# Patient Record
Sex: FEMALE | URBAN - METROPOLITAN AREA
[De-identification: names, ages, dates, MRNs, and addresses within clinical notes are randomized per-mention and may not be internally consistent; named-entity substitution may affect disease eponyms.]

---

## 2023-02-01 LAB
C. TRACHOMATIS, EXTERNAL RESULT: NEGATIVE
N. GONORRHOEAE, EXTERNAL RESULT: NEGATIVE

## 2023-02-24 LAB
ABO, EXTERNAL RESULT: NORMAL
HEP B, EXTERNAL RESULT: NEGATIVE
HIV, EXTERNAL RESULT: NON REACTIVE
RH FACTOR, EXTERNAL RESULT: POSITIVE
RPR, EXTERNAL RESULT: NON REACTIVE
RUBELLA TITER, EXTERNAL RESULT: NORMAL

## 2023-09-11 ENCOUNTER — ANESTHESIA (OUTPATIENT)
Dept: LABOR AND DELIVERY | Age: 28
End: 2023-09-11
Payer: OTHER GOVERNMENT

## 2023-09-11 ENCOUNTER — ANESTHESIA EVENT (OUTPATIENT)
Dept: LABOR AND DELIVERY | Age: 28
End: 2023-09-11
Payer: OTHER GOVERNMENT

## 2023-09-11 ENCOUNTER — APPOINTMENT (OUTPATIENT)
Dept: LABOR AND DELIVERY | Age: 28
End: 2023-09-11
Payer: OTHER GOVERNMENT

## 2023-09-11 ENCOUNTER — HOSPITAL ENCOUNTER (INPATIENT)
Age: 28
LOS: 2 days | Discharge: HOME | End: 2023-09-13
Attending: OBSTETRICS & GYNECOLOGY | Admitting: OBSTETRICS & GYNECOLOGY
Payer: OTHER GOVERNMENT

## 2023-09-11 PROBLEM — Z34.90 TERM PREGNANCY: Status: ACTIVE | Noted: 2023-09-11

## 2023-09-11 LAB
ABO + RH BLD: NORMAL
AMPHETAMINES UR QL SCN>1000 NG/ML: NORMAL
BARBITURATES UR QL SCN>200 NG/ML: NORMAL
BASOPHILS # BLD: 0 K/UL (ref 0–0.2)
BASOPHILS NFR BLD: 0.3 %
BENZODIAZ UR QL SCN>200 NG/ML: NORMAL
BLD GP AB SCN SERPL QL: NORMAL
BUPRENORPHINE+NOR UR QL SCN: NORMAL
CANNABINOIDS UR QL SCN>50 NG/ML: NORMAL
COCAINE UR QL SCN: NORMAL
DEPRECATED RDW RBC AUTO: 14.5 % (ref 12.4–15.4)
DRUG SCREEN COMMENT UR-IMP: NORMAL
EOSINOPHIL # BLD: 0.1 K/UL (ref 0–0.6)
EOSINOPHIL NFR BLD: 0.8 %
FENTANYL SCREEN, URINE: NORMAL
HCT VFR BLD AUTO: 36.6 % (ref 36–48)
HGB BLD-MCNC: 12.2 G/DL (ref 12–16)
LYMPHOCYTES # BLD: 1.9 K/UL (ref 1–5.1)
LYMPHOCYTES NFR BLD: 21.8 %
MCH RBC QN AUTO: 27.6 PG (ref 26–34)
MCHC RBC AUTO-ENTMCNC: 33.4 G/DL (ref 31–36)
MCV RBC AUTO: 82.7 FL (ref 80–100)
METHADONE UR QL SCN>300 NG/ML: NORMAL
MONOCYTES # BLD: 0.5 K/UL (ref 0–1.3)
MONOCYTES NFR BLD: 6.4 %
NEUTROPHILS # BLD: 6.1 K/UL (ref 1.7–7.7)
NEUTROPHILS NFR BLD: 70.7 %
OPIATES UR QL SCN>300 NG/ML: NORMAL
OXYCODONE UR QL SCN: NORMAL
PCP UR QL SCN>25 NG/ML: NORMAL
PH UR STRIP: 6 [PH]
PLATELET # BLD AUTO: 175 K/UL (ref 135–450)
PMV BLD AUTO: 8.7 FL (ref 5–10.5)
RBC # BLD AUTO: 4.43 M/UL (ref 4–5.2)
REAGIN+T PALLIDUM IGG+IGM SERPL-IMP: NORMAL
WBC # BLD AUTO: 8.6 K/UL (ref 4–11)

## 2023-09-11 PROCEDURE — 1220000000 HC SEMI PRIVATE OB R&B

## 2023-09-11 PROCEDURE — 86900 BLOOD TYPING SEROLOGIC ABO: CPT

## 2023-09-11 PROCEDURE — 3700000025 EPIDURAL BLOCK: Performed by: ANESTHESIOLOGY

## 2023-09-11 PROCEDURE — 2500000003 HC RX 250 WO HCPCS: Performed by: NURSE ANESTHETIST, CERTIFIED REGISTERED

## 2023-09-11 PROCEDURE — 86901 BLOOD TYPING SEROLOGIC RH(D): CPT

## 2023-09-11 PROCEDURE — 6360000002 HC RX W HCPCS: Performed by: OBSTETRICS & GYNECOLOGY

## 2023-09-11 PROCEDURE — 85025 COMPLETE CBC W/AUTO DIFF WBC: CPT

## 2023-09-11 PROCEDURE — 6360000002 HC RX W HCPCS: Performed by: NURSE ANESTHETIST, CERTIFIED REGISTERED

## 2023-09-11 PROCEDURE — 80307 DRUG TEST PRSMV CHEM ANLYZR: CPT

## 2023-09-11 PROCEDURE — 86850 RBC ANTIBODY SCREEN: CPT

## 2023-09-11 PROCEDURE — 86780 TREPONEMA PALLIDUM: CPT

## 2023-09-11 PROCEDURE — 2580000003 HC RX 258: Performed by: OBSTETRICS & GYNECOLOGY

## 2023-09-11 PROCEDURE — 6370000000 HC RX 637 (ALT 250 FOR IP): Performed by: OBSTETRICS & GYNECOLOGY

## 2023-09-11 RX ORDER — BUPIVACAINE HYDROCHLORIDE 2.5 MG/ML
INJECTION, SOLUTION EPIDURAL; INFILTRATION; INTRACAUDAL PRN
Status: DISCONTINUED | OUTPATIENT
Start: 2023-09-11 | End: 2023-09-12 | Stop reason: SDUPTHER

## 2023-09-11 RX ORDER — CARBOPROST TROMETHAMINE 250 UG/ML
250 INJECTION, SOLUTION INTRAMUSCULAR PRN
Status: DISCONTINUED | OUTPATIENT
Start: 2023-09-11 | End: 2023-09-12

## 2023-09-11 RX ORDER — MISOPROSTOL 100 UG/1
800 TABLET ORAL PRN
Status: DISCONTINUED | OUTPATIENT
Start: 2023-09-11 | End: 2023-09-12

## 2023-09-11 RX ORDER — CEFAZOLIN SODIUM IN 0.9 % NACL 2 G/100 ML
2000 PLASTIC BAG, INJECTION (ML) INTRAVENOUS ONCE
Status: COMPLETED | OUTPATIENT
Start: 2023-09-11 | End: 2023-09-11

## 2023-09-11 RX ORDER — ONDANSETRON 2 MG/ML
4 INJECTION INTRAMUSCULAR; INTRAVENOUS EVERY 6 HOURS PRN
Status: DISCONTINUED | OUTPATIENT
Start: 2023-09-11 | End: 2023-09-12

## 2023-09-11 RX ORDER — SODIUM CHLORIDE 9 MG/ML
25 INJECTION, SOLUTION INTRAVENOUS PRN
Status: DISCONTINUED | OUTPATIENT
Start: 2023-09-11 | End: 2023-09-12

## 2023-09-11 RX ORDER — SODIUM CHLORIDE, SODIUM LACTATE, POTASSIUM CHLORIDE, CALCIUM CHLORIDE 600; 310; 30; 20 MG/100ML; MG/100ML; MG/100ML; MG/100ML
INJECTION, SOLUTION INTRAVENOUS CONTINUOUS
Status: DISCONTINUED | OUTPATIENT
Start: 2023-09-11 | End: 2023-09-12

## 2023-09-11 RX ORDER — SODIUM CHLORIDE 0.9 % (FLUSH) 0.9 %
5-40 SYRINGE (ML) INJECTION PRN
Status: DISCONTINUED | OUTPATIENT
Start: 2023-09-11 | End: 2023-09-12

## 2023-09-11 RX ORDER — SODIUM CHLORIDE, SODIUM LACTATE, POTASSIUM CHLORIDE, AND CALCIUM CHLORIDE .6; .31; .03; .02 G/100ML; G/100ML; G/100ML; G/100ML
1000 INJECTION, SOLUTION INTRAVENOUS PRN
Status: DISCONTINUED | OUTPATIENT
Start: 2023-09-11 | End: 2023-09-12

## 2023-09-11 RX ORDER — SODIUM CHLORIDE 0.9 % (FLUSH) 0.9 %
5-40 SYRINGE (ML) INJECTION EVERY 12 HOURS SCHEDULED
Status: DISCONTINUED | OUTPATIENT
Start: 2023-09-11 | End: 2023-09-12

## 2023-09-11 RX ORDER — DIPHENHYDRAMINE HYDROCHLORIDE 50 MG/ML
25 INJECTION INTRAMUSCULAR; INTRAVENOUS EVERY 4 HOURS PRN
Status: DISCONTINUED | OUTPATIENT
Start: 2023-09-11 | End: 2023-09-12

## 2023-09-11 RX ORDER — ACETAMINOPHEN 325 MG/1
650 TABLET ORAL EVERY 4 HOURS PRN
Status: DISCONTINUED | OUTPATIENT
Start: 2023-09-11 | End: 2023-09-12

## 2023-09-11 RX ORDER — METHYLERGONOVINE MALEATE 0.2 MG/ML
200 INJECTION INTRAVENOUS PRN
Status: DISCONTINUED | OUTPATIENT
Start: 2023-09-11 | End: 2023-09-12

## 2023-09-11 RX ORDER — SODIUM CHLORIDE, SODIUM LACTATE, POTASSIUM CHLORIDE, AND CALCIUM CHLORIDE .6; .31; .03; .02 G/100ML; G/100ML; G/100ML; G/100ML
500 INJECTION, SOLUTION INTRAVENOUS PRN
Status: DISCONTINUED | OUTPATIENT
Start: 2023-09-11 | End: 2023-09-12

## 2023-09-11 RX ADMIN — Medication 15 ML/HR: at 22:23

## 2023-09-11 RX ADMIN — Medication 25 MCG: at 10:05

## 2023-09-11 RX ADMIN — Medication 1 MILLI-UNITS/MIN: at 14:25

## 2023-09-11 RX ADMIN — SODIUM CHLORIDE, POTASSIUM CHLORIDE, SODIUM LACTATE AND CALCIUM CHLORIDE: 600; 310; 30; 20 INJECTION, SOLUTION INTRAVENOUS at 14:15

## 2023-09-11 RX ADMIN — Medication 2000 MG: at 17:35

## 2023-09-11 RX ADMIN — BUPIVACAINE HYDROCHLORIDE 7 ML: 2.5 INJECTION, SOLUTION EPIDURAL; INFILTRATION; INTRACAUDAL; PERINEURAL at 22:17

## 2023-09-11 NOTE — H&P
Department of Obstetrics and Gynecology  Labor and Delivery   Attending Progress Note      SUBJECTIVE:  Patient presents for IOL due to LGA, mild polyhydramnios    OBJECTIVE:      Fetal heart rate:       Baseline Heart Rate:  130        Accelerations:  present       Long Term Variability:  moderate       Decelerations:  absent         Contraction frequency: 0 minutes    Membranes:  Intact    Cervix:         Dilation:  2 cm         Effacement:  50%         Station:  -2                    ASSESSMENT & PLAN:    33 y.o.    OB History          3    Para   1    Term   1            AB   1    Living             SAB   1    IAB        Ectopic        Molar        Multiple        Live Births                 39w2d  1. FWB: reassuring  2. ACOG reviewed. A+, GBS+ in Urine Cx. PCN allergy. Use of Ancef and possibility of cross-reactivity discussed. Patient believes she has had a Cephalosporin without adverse reaction. 3. LGA/Poly:  EFW on  4503 grams(99%), NICKI: 25.1 cm. Options of induction of labor vs proceeding with Primary LTCS reviewed with patient by Dr. Palma Mendoza. Patient reports understanding of options and desires trial of labor. 4.  Induction of labor. Use of Cytotec vs Pitocin reviewed. Patient agrees to use of Cytotec initially.

## 2023-09-12 PROCEDURE — 0KQM0ZZ REPAIR PERINEUM MUSCLE, OPEN APPROACH: ICD-10-PCS | Performed by: OBSTETRICS & GYNECOLOGY

## 2023-09-12 PROCEDURE — 2580000003 HC RX 258: Performed by: OBSTETRICS & GYNECOLOGY

## 2023-09-12 PROCEDURE — 6370000000 HC RX 637 (ALT 250 FOR IP): Performed by: OBSTETRICS & GYNECOLOGY

## 2023-09-12 PROCEDURE — 10907ZC DRAINAGE OF AMNIOTIC FLUID, THERAPEUTIC FROM PRODUCTS OF CONCEPTION, VIA NATURAL OR ARTIFICIAL OPENING: ICD-10-PCS | Performed by: OBSTETRICS & GYNECOLOGY

## 2023-09-12 PROCEDURE — 1220000000 HC SEMI PRIVATE OB R&B

## 2023-09-12 PROCEDURE — 7200000001 HC VAGINAL DELIVERY

## 2023-09-12 PROCEDURE — 6360000002 HC RX W HCPCS: Performed by: OBSTETRICS & GYNECOLOGY

## 2023-09-12 PROCEDURE — 51701 INSERT BLADDER CATHETER: CPT

## 2023-09-12 PROCEDURE — 3E0P7VZ INTRODUCTION OF HORMONE INTO FEMALE REPRODUCTIVE, VIA NATURAL OR ARTIFICIAL OPENING: ICD-10-PCS | Performed by: OBSTETRICS & GYNECOLOGY

## 2023-09-12 RX ORDER — ONDANSETRON 2 MG/ML
4 INJECTION INTRAMUSCULAR; INTRAVENOUS EVERY 6 HOURS PRN
Status: DISCONTINUED | OUTPATIENT
Start: 2023-09-12 | End: 2023-09-13 | Stop reason: HOSPADM

## 2023-09-12 RX ORDER — IBUPROFEN 600 MG/1
600 TABLET ORAL EVERY 8 HOURS PRN
Status: DISCONTINUED | OUTPATIENT
Start: 2023-09-12 | End: 2023-09-13 | Stop reason: HOSPADM

## 2023-09-12 RX ORDER — SODIUM CHLORIDE 0.9 % (FLUSH) 0.9 %
5-40 SYRINGE (ML) INJECTION PRN
Status: DISCONTINUED | OUTPATIENT
Start: 2023-09-12 | End: 2023-09-13 | Stop reason: HOSPADM

## 2023-09-12 RX ORDER — SIMETHICONE 80 MG
80 TABLET,CHEWABLE ORAL EVERY 6 HOURS PRN
Status: DISCONTINUED | OUTPATIENT
Start: 2023-09-12 | End: 2023-09-13 | Stop reason: HOSPADM

## 2023-09-12 RX ORDER — FERROUS SULFATE 325(65) MG
325 TABLET ORAL 2 TIMES DAILY WITH MEALS
Status: DISCONTINUED | OUTPATIENT
Start: 2023-09-12 | End: 2023-09-13 | Stop reason: HOSPADM

## 2023-09-12 RX ORDER — ACETAMINOPHEN 500 MG
1000 TABLET ORAL EVERY 8 HOURS PRN
Status: DISCONTINUED | OUTPATIENT
Start: 2023-09-12 | End: 2023-09-13 | Stop reason: HOSPADM

## 2023-09-12 RX ORDER — SODIUM CHLORIDE, SODIUM LACTATE, POTASSIUM CHLORIDE, CALCIUM CHLORIDE 600; 310; 30; 20 MG/100ML; MG/100ML; MG/100ML; MG/100ML
INJECTION, SOLUTION INTRAVENOUS CONTINUOUS
Status: DISCONTINUED | OUTPATIENT
Start: 2023-09-12 | End: 2023-09-13 | Stop reason: HOSPADM

## 2023-09-12 RX ORDER — DOCUSATE SODIUM 100 MG/1
100 CAPSULE, LIQUID FILLED ORAL 2 TIMES DAILY PRN
Status: DISCONTINUED | OUTPATIENT
Start: 2023-09-12 | End: 2023-09-13 | Stop reason: HOSPADM

## 2023-09-12 RX ORDER — LANOLIN 100 %
OINTMENT (GRAM) TOPICAL PRN
Status: DISCONTINUED | OUTPATIENT
Start: 2023-09-12 | End: 2023-09-13 | Stop reason: HOSPADM

## 2023-09-12 RX ORDER — SODIUM CHLORIDE 9 MG/ML
INJECTION, SOLUTION INTRAVENOUS PRN
Status: DISCONTINUED | OUTPATIENT
Start: 2023-09-12 | End: 2023-09-13 | Stop reason: HOSPADM

## 2023-09-12 RX ORDER — SODIUM CHLORIDE 0.9 % (FLUSH) 0.9 %
5-40 SYRINGE (ML) INJECTION EVERY 12 HOURS SCHEDULED
Status: DISCONTINUED | OUTPATIENT
Start: 2023-09-12 | End: 2023-09-13 | Stop reason: HOSPADM

## 2023-09-12 RX ADMIN — Medication 909.1 MILLI-UNITS/MIN: at 05:15

## 2023-09-12 RX ADMIN — SODIUM CHLORIDE, POTASSIUM CHLORIDE, SODIUM LACTATE AND CALCIUM CHLORIDE: 600; 310; 30; 20 INJECTION, SOLUTION INTRAVENOUS at 01:04

## 2023-09-12 RX ADMIN — DOCUSATE SODIUM 100 MG: 100 CAPSULE, LIQUID FILLED ORAL at 16:58

## 2023-09-12 RX ADMIN — IBUPROFEN 600 MG: 600 TABLET ORAL at 16:57

## 2023-09-12 RX ADMIN — ACETAMINOPHEN 1000 MG: 500 TABLET ORAL at 20:27

## 2023-09-12 RX ADMIN — IBUPROFEN 600 MG: 600 TABLET ORAL at 08:00

## 2023-09-12 RX ADMIN — SODIUM CHLORIDE, PRESERVATIVE FREE 5 ML: 5 INJECTION INTRAVENOUS at 10:30

## 2023-09-12 RX ADMIN — WITCH HAZEL: 500 SOLUTION RECTAL; TOPICAL at 08:01

## 2023-09-12 RX ADMIN — SIMETHICONE 80 MG: 80 TABLET, CHEWABLE ORAL at 16:58

## 2023-09-12 RX ADMIN — BENZOCAINE AND LEVOMENTHOL: 200; 5 SPRAY TOPICAL at 08:01

## 2023-09-12 RX ADMIN — Medication 87.3 MILLI-UNITS/MIN: at 05:14

## 2023-09-12 RX ADMIN — CEFAZOLIN 1000 MG: 1 INJECTION, POWDER, FOR SOLUTION INTRAMUSCULAR; INTRAVENOUS at 01:25

## 2023-09-12 RX ADMIN — SODIUM CHLORIDE, PRESERVATIVE FREE 10 ML: 5 INJECTION INTRAVENOUS at 20:30

## 2023-09-12 RX ADMIN — ACETAMINOPHEN 1000 MG: 500 TABLET ORAL at 12:01

## 2023-09-12 ASSESSMENT — PAIN - FUNCTIONAL ASSESSMENT
PAIN_FUNCTIONAL_ASSESSMENT: ACTIVITIES ARE NOT PREVENTED

## 2023-09-12 ASSESSMENT — PAIN DESCRIPTION - ORIENTATION
ORIENTATION: LOWER
ORIENTATION: LOWER
ORIENTATION: LOWER;MID

## 2023-09-12 ASSESSMENT — PAIN DESCRIPTION - DESCRIPTORS
DESCRIPTORS: SORE
DESCRIPTORS: CRAMPING
DESCRIPTORS: DISCOMFORT
DESCRIPTORS: SORE

## 2023-09-12 ASSESSMENT — PAIN DESCRIPTION - LOCATION
LOCATION: COCCYX
LOCATION: ABDOMEN
LOCATION: COCCYX
LOCATION: BUTTOCKS;COCCYX

## 2023-09-12 ASSESSMENT — PAIN SCALES - GENERAL
PAINLEVEL_OUTOF10: 1
PAINLEVEL_OUTOF10: 2
PAINLEVEL_OUTOF10: 3
PAINLEVEL_OUTOF10: 2

## 2023-09-12 NOTE — FLOWSHEET NOTE
Patient assisted OOB for first time since delivery. Upon sitting up in bed patient started to feel weakness and lightheadiness so assisted back to supine position. BP stable. FF1-U. Bleeding scant to small, no clots. Informed patient to rest and will attempt getting up in about 30 minutes.

## 2023-09-12 NOTE — FLOWSHEET NOTE
Patient assisted OOB to bathroom for first time get up. Patient tolerated well. Patient voided 250mL and then started feeling weak and dizzy so assisted patient to wheelchair safely and after approximately 1 minute patient states she started feeling better. Transferred patient and family and all belongings to postpartum room 319 at this time. VSS bleeding scant, fundus 2-U.

## 2023-09-12 NOTE — PROGRESS NOTES
Patient getting uncomfortable with contractions, desiring epidural placement  Cat I tracing  Contractions q 1 on 4 la-units Pitocin. Okay for epidural, agreeable to SROM after placement.

## 2023-09-12 NOTE — PROGRESS NOTES
Patient comfortable with epid  Cat 1 tracing  Ctxn: q 2 - 4 on 4 la-units Pitocin  Cervix: 4- 5/70/-2  AROM with return of clear fluid performed without difficulty.

## 2023-09-12 NOTE — PROGRESS NOTES
Pt states that she thinks that her water broke. SVE 5/70/-2 and no obvious fluid seen on exam. Feels like there is a forebag present. Will let primary RNLILIANA. Karl Spear RN know of findings.   Saloni Robert RN

## 2023-09-12 NOTE — PLAN OF CARE
Problem: Genitourinary - Adult  Goal: Absence of urinary retention  Outcome: Progressing
Problem: Postpartum  Goal: Experiences normal postpartum course  Description:  Postpartum OB-Pregnancy care plan goal which identifies if the mother is experiencing a normal postpartum course  2023 by Roxy Azul RN  Outcome: Progressing  2023 by Meggan Wood  Outcome: Progressing  Goal: Appropriate maternal -  bonding  Description:  Postpartum OB-Pregnancy care plan goal which identifies if the mother and  are bonding appropriately  2023 by Roxy Azul RN  Outcome: Progressing  2023 by Meggan Wood  Outcome: Progressing  Goal: Establishment of infant feeding pattern  Description:  Postpartum OB-Pregnancy care plan goal which identifies if the mother is establishing a feeding pattern with their   2023 8219 by Roxy Azul RN  Outcome: Progressing  2023 by Meggan Wood  Outcome: Progressing  Goal: Incisions, wounds, or drain sites healing without S/S of infection  2023 by Roxy Azul RN  Outcome: Progressing  2023 by Meggan Wood  Outcome: Progressing     Problem: Pain  Goal: Verbalizes/displays adequate comfort level or baseline comfort level  2023 by Roxy Azul RN  Outcome: Progressing  2023 by Meggan Wood  Outcome: Progressing  [address] (Taken 2023 0737 by Roxy Azul RN)  Verbalizes/displays adequate comfort level or baseline comfort level:   Encourage patient to monitor pain and request assistance   Assess pain using appropriate pain scale   Administer analgesics based on type and severity of pain and evaluate response   Implement non-pharmacological measures as appropriate and evaluate response     Problem: Safety - Adult  Goal: Free from fall injury  2023 by Roxy Azul RN  Outcome: Progressing  2023 by Meggan Wood  Outcome: Progressing     Problem: Discharge Planning  Goal: Discharge to home or other facility
Pt noted to be completely dilated/effaced but at 0 station at 0405. Allowed pt to labor down to allow for passive descent. Notified Dr. Kay Birch. RN gave trial push at 8531 with great maternal effort and fetal movement. Dr. Kay Birch called to bedside for delivery, and began pushing with Dr. Kay Birch at bedside at 5949, infant delivered with midline episiotomy performed at 5. Placenta delivered spontaneously at 0519 and a postpartum pitocin bolus was administered at that time. Dr. Kay Birch repaired a 2nd degree perineal tear, and pt entered recovery phase at [facility].     Problem: Vaginal Birth or  Section  Goal: Fetal and maternal status remain reassuring during the birth process  Description:  Birth OB-Pregnancy care plan goal which identifies if the fetal and maternal status remain reassuring during the birth process  Outcome: Completed     Problem: Postpartum  Goal: Experiences normal postpartum course  Description:  Postpartum OB-Pregnancy care plan goal which identifies if the mother is experiencing a normal postpartum course  Outcome: Progressing  Goal: Appropriate maternal -  bonding  Description:  Postpartum OB-Pregnancy care plan goal which identifies if the mother and  are bonding appropriately  Outcome: Progressing  Goal: Establishment of infant feeding pattern  Description:  Postpartum OB-Pregnancy care plan goal which identifies if the mother is establishing a feeding pattern with their   Outcome: Progressing  Goal: Incisions, wounds, or drain sites healing without S/S of infection  Outcome: Progressing     Problem: Pain  Goal: Verbalizes/displays adequate comfort level or baseline comfort level  Outcome: Progressing     Problem: Safety - Adult  Goal: Free from fall injury  Outcome: Progressing     Problem: Discharge Planning  Goal: Discharge to home or other facility with appropriate resources  Outcome: Progressing
appropriate and evaluate response     Problem: Safety - Adult  Goal: Free from fall injury  9/12/2023 1932 by Virginia Cabrera RN  Outcome: Progressing  9/12/2023 0823 by Aura Villafana RN  Outcome: Progressing  9/12/2023 0809 by Eddy Lucio  Outcome: Progressing     Problem: Discharge Planning  Goal: Discharge to home or other facility with appropriate resources  9/12/2023 1932 by Virginia Cabrera RN  Outcome: Progressing  9/12/2023 0823 by Aura Villafana RN  Outcome: Progressing  9/12/2023 0809 by Eddy Lucio  Outcome: Progressing     Problem: Skin/Tissue Integrity  Goal: Absence of new skin breakdown  Description: 1. Monitor for areas of redness and/or skin breakdown  2. Assess vascular access sites hourly  3. Every 4-6 hours minimum:  Change oxygen saturation probe site  4. Every 4-6 hours:  If on nasal continuous positive airway pressure, respiratory therapy assess nares and determine need for appliance change or resting period.   9/12/2023 1932 by Virginia Cabrera RN  Outcome: Progressing  9/12/2023 0823 by Aura Villafana RN  Outcome: Progressing     Problem: Skin/Tissue Integrity - Adult  Goal: Incisions, wounds, or drain sites healing without S/S of infection  9/12/2023 1932 by Virginia Cabrera RN  Outcome: Progressing  9/12/2023 0823 by Aura Villafana RN  Outcome: Progressing  9/12/2023 0809 by Eddy Lucio  Outcome: Progressing  Goal: Skin integrity remains intact  Outcome: Progressing  Goal: Oral mucous membranes remain intact  Outcome: Progressing     Problem: Genitourinary - Adult  Goal: Absence of urinary retention  9/12/2023 1932 by Virginia Cabrera RN  Outcome: Progressing  9/12/2023 0825 by Aura Villafana RN  Outcome: Progressing

## 2023-09-12 NOTE — ANESTHESIA PROCEDURE NOTES
Epidural Block    Patient location during procedure: OB  Start time: 9/11/2023 10:02 PM  End time: 9/11/2023 10:23 PM  Reason for block: labor epidural  Staffing  Performed: resident/CRNA   Resident/CRNA: BINH Alcantar CRNA  Performed by: BINH Alcantar CRNA  Authorized by: Deangelo Montero MD    Epidural  Patient position: sitting  Prep: ChloraPrep  Patient monitoring: continuous pulse ox  Approach: midline  Location: L3-4  Injection technique: EMILY saline  Provider prep: mask  Needle  Needle type: Tuohy   Needle gauge: 17 G  Needle length: 3.5 in  Needle insertion depth: 7 cm  Catheter type: side hole  Catheter size: 19 G  Catheter at skin depth: 12 cm  Test dose: negativeCatheter Secured: tegaderm and tape  Assessment  Sensory level: T8  Hemodynamics: stable  Attempts: 1  Outcomes: uncomplicated and patient tolerated procedure well  Additional Notes  Sitting, Sterile prep/drape, 1%Xylo at L3-4, 17ga Tuohy with EMILY, 25ga Pencan for w/+CSF for DPE, Pencan removed, Catheter inserted, negative test dose, sterile dressing applied.    Preanesthetic Checklist  Completed: patient identified, IV checked, site marked, risks and benefits discussed, surgical/procedural consents, equipment checked, pre-op evaluation, timeout performed, anesthesia consent given, oxygen available and monitors applied/VS acknowledged

## 2023-09-12 NOTE — L&D DELIVERY NOTE
Department of Obstetrics and Gynecology  Spontaneous Vaginal Delivery Note    Labor & Delivery Summary  Dilation Complete Date: 23  Dilation Complete Time: 0405    Pre-operative Diagnosis:  Term pregnancy, Induced labor, and LGA, Poly    Post-operative Diagnosis:  Female    Procedure:  Spontaneous vaginal delivery or Repair second degree spontaneous laceration    Surgeon:   JACKSON Blank MD    Information for the patient's :  [address] [place] [address], [address] [place] [address] [[id number]]        Anesthesia:  epidural anesthesia    Estimated blood loss:  400ml    Specimen:  Placenta not sent to pathology     Cord blood sent No    Complications:  none    Condition:  mother stable    Details of Procedure: The patient is a 33 y.o. female at 43w2d   OB History          3    Para   1    Term   1            AB   1    Living             SAB   1    IAB        Ectopic        Molar        Multiple        Live Births                 who was admitted for induction. She received the following interventions: vaginal Cytotec x1 and IV Pitocin augmentation to max of 6 la-units. She was known to be GBS positive and did receive antibiotic prophylaxis. The patient progressed well,did receive an epidural, became complete and started to push. After pushing for 12 minutes,  a midline episiotomy was performed and the fetus delivered atraumatically and was placed on the mother's abdomen. Cord was clamped and cut. The infant was evaluated at the bedside by the waiting nurse. The delivery of the placenta was spontaneous and intact. . The perineum and vagina were explored and a second degree laceration was repaired in standard fashion with 3 O Vicryl. Apgars 9 and 9 at one and five minutes respectively.

## 2023-09-12 NOTE — LACTATION NOTE
This note was copied from a baby's chart. Lactation Progress Note      Data:  RN requesting [address] assistance with multip breast feeding. Mob struggled with latch and supply with first baby. This baby is LGA and blood sugars have been WNL so far. Action: Assisted with good position skin to skin at breast. Baby rooting and a good deep latch eventually achieved. Mob reported pinching at first but when latch was broken nipple was round. Re latched with a more comfortable latch with SRS and AS. Breast feeding education initiated. Encouraged to allow baby to go to breast ad romana and stressed the importance of always achieving a good deep comfortable latch. Offered f/u assistance prn. Discouraged paci and bottles for the first few weeks. Encouraged good hydration and nutrition. [address] number on board for f/u. Response: Pleased with breast feed. Verbalized and demonstrated understanding but remains anxious about breast feeding. Will need f/u.

## 2023-09-12 NOTE — L&D DELIVERY SUMMARY NOTE
[address] [place] [id number]                            LABOR AND DELIVERY NOTE    PATIENT NAME: Mel Rader                    :        1995  MED REC NO:   [id number]                          ROOM:       [id number]  ACCOUNT NO:   [de-identified]                           ADMIT DATE: 2023  PROVIDER:     Alesia Shay MD    DATE OF PROCEDURE:  2023    DELIVERY SUMMARY    The patient is a 76-year-old G3, P1-0-1-0 that presented at 39 weeks and  2 days for induction of labor due to suspected LGA and mild  polyhydramnios. She had had an ultrasound on . The estimated  fetal weight of 4503 gm in the 99th percentile and had an NICKI of 25.1  cm. Options to have induction of labor versus proceeding with primary  low-transverse  section were reviewed with the patient by   _____. The patient reported understanding and opted for a trial of  labor. She was noted to be GBS positive and did receive antibiotic  prophylaxis. She received Cytotec x1 and IV Pitocin augmentation to  maximum of 6 milliunits. She had an artificial rupture of membranes  performed after requesting and receiving an epidural.  She became  complete and started to push. After pushing for 12 minutes, a midline  episiotomy was performed and then the fetus delivered atraumatically and  was placed on the mother's abdomen. The cord was doubly clamped and  cut. The infant was evaluated at the bedside by the awaiting nurse. The delivery of the placenta was spontaneous, intact. Perineum and  vagina were explored and the episiotomy extended to a second-degree  midline laceration which was repaired in the normal sterile fashion with  3-0 Vicryl. Apgars were 9 and 9 at one and five minutes respectively. Estimated blood loss 400 mL. Complications none. Condition stable.         Alesia Shay MD    D: 2023 5:54:50       T:

## 2023-09-13 VITALS
HEIGHT: 69 IN | HEART RATE: 84 BPM | WEIGHT: 215 LBS | SYSTOLIC BLOOD PRESSURE: 104 MMHG | BODY MASS INDEX: 31.84 KG/M2 | TEMPERATURE: 97.8 F | RESPIRATION RATE: 18 BRPM | OXYGEN SATURATION: 97 % | DIASTOLIC BLOOD PRESSURE: 70 MMHG

## 2023-09-13 LAB
DEPRECATED RDW RBC AUTO: 14.8 % (ref 12.4–15.4)
HCT VFR BLD AUTO: 29.1 % (ref 36–48)
HGB BLD-MCNC: 9.7 G/DL (ref 12–16)
MCH RBC QN AUTO: 27.9 PG (ref 26–34)
MCHC RBC AUTO-ENTMCNC: 33.5 G/DL (ref 31–36)
MCV RBC AUTO: 83.2 FL (ref 80–100)
PLATELET # BLD AUTO: 156 K/UL (ref 135–450)
PMV BLD AUTO: 8.6 FL (ref 5–10.5)
RBC # BLD AUTO: 3.49 M/UL (ref 4–5.2)
WBC # BLD AUTO: 10.6 K/UL (ref 4–11)

## 2023-09-13 PROCEDURE — 85027 COMPLETE CBC AUTOMATED: CPT

## 2023-09-13 PROCEDURE — 6370000000 HC RX 637 (ALT 250 FOR IP): Performed by: OBSTETRICS & GYNECOLOGY

## 2023-09-13 PROCEDURE — 36415 COLL VENOUS BLD VENIPUNCTURE: CPT

## 2023-09-13 RX ORDER — IBUPROFEN 600 MG/1
600 TABLET ORAL EVERY 6 HOURS PRN
Qty: 25 TABLET | Refills: 1 | Status: SHIPPED | OUTPATIENT
Start: 2023-09-13

## 2023-09-13 RX ADMIN — IBUPROFEN 600 MG: 600 TABLET ORAL at 01:39

## 2023-09-13 RX ADMIN — IBUPROFEN 600 MG: 600 TABLET ORAL at 08:24

## 2023-09-13 RX ADMIN — ACETAMINOPHEN 1000 MG: 500 TABLET ORAL at 04:59

## 2023-09-13 RX ADMIN — DOCUSATE SODIUM 100 MG: 100 CAPSULE, LIQUID FILLED ORAL at 08:23

## 2023-09-13 ASSESSMENT — PAIN SCALES - GENERAL
PAINLEVEL_OUTOF10: 2
PAINLEVEL_OUTOF10: 1
PAINLEVEL_OUTOF10: 2

## 2023-09-13 ASSESSMENT — PAIN DESCRIPTION - LOCATION
LOCATION: ABDOMEN
LOCATION: ABDOMEN
LOCATION: PERINEUM

## 2023-09-13 ASSESSMENT — PAIN - FUNCTIONAL ASSESSMENT
PAIN_FUNCTIONAL_ASSESSMENT: ACTIVITIES ARE NOT PREVENTED

## 2023-09-13 ASSESSMENT — PAIN DESCRIPTION - DESCRIPTORS
DESCRIPTORS: CRAMPING
DESCRIPTORS: CRAMPING
DESCRIPTORS: SORE

## 2023-09-13 NOTE — ANESTHESIA POSTPROCEDURE EVALUATION
Department of Anesthesiology  Postprocedure Note    Patient: Omayra Romero  MRN: [id number]  YOB: 1995  Date of evaluation: 9/13/2023      Procedure Summary     Date: 09/11/23 Room / Location:     Anesthesia Start: 2202 Anesthesia Stop: 09/12/23 0510    Procedure: Labor Analgesia Diagnosis:     Scheduled Providers:  Responsible Provider: Neptali Marte MD    Anesthesia Type: epidural ASA Status: 2          Anesthesia Type: No value filed. Daiana Phase I: Daiana Score: 9    Daiana Phase II: Daiana Score: 9      Anesthesia Post Evaluation    Level of consciousness: awake  Complications: no  Cardiovascular status: hemodynamically stable  Respiratory status: acceptable  Comments: No apparent complications from neuraxial anesthesia.   Pain management: adequate

## 2023-09-13 NOTE — FLOWSHEET NOTE
Discharge Phone Call    Patient Name: Fernie Griffith     [facility] Care Provider: Sandeep Ma MD Discharge Date: 2023    Disposition of baby:    Phone Number: [phone] (home)     Attempts to Contact:  Date:    Caller  Date:    Caller  Date:    Caller    Information for the patient's :  [address] [place] [address], [address] [place] [address] [[id number]]   Delivery Method: Vaginal, Spontaneous     1. Now that you are at home is your pain being well controlled? Y/N   If no, instruct to call       provider. 2. Are you breastfeeding? Y/N    Do you need any extra support from our lactation staff? Y/N    If yes, provide number for lactation. 3. Have you made or already had your first appointment with the baby's doctor? Y/N   If no, do      you know when to schedule it? Y/N    4. Have you scheduled your follow-up appointment? Y/N  If no, do you know when to schedule       it? Y/N   If no, they can find it on printed discharge instructions. 5. Did staff discuss safe sleep during your stay? Y/N   6. Did we explain things in a way you could understand? Y/N  7. Were we respectful of your preferences for labor and birth and include you in the plan of       care? Y/N  If no, please explain _______________________________________________  8. Is there anyone in particular you would like to mention who provided care for you? _______      _________________________________________________________________________     9. Were you given a Post-Birth Warning Signs handout? Y/N  Do you have it somewhere      easily accessible? Y/N  If no, please send them a copy and ask them to put it somewhere      easily found. 10. Have you been crying excessively, having anger or mood swings that feel out of control, or       feel like you can't cope with caring for yourself or baby? Y/N   If yes, they may be showing       signs of postpartum depression and should call provider.  There is also a        depression test on page C5

## 2023-09-13 NOTE — FLOWSHEET NOTE
Postpartum and infant care teaching completed and forms signed by patient. Copy witnessed by RN and given to patient. Patient verbalized understanding of all teaching points and denied further questions. Prescriptions given to the patient. Patient plans to follow-up with [facility] Provider as instructed. ID bands checked. Father's ID band and one of baby's ID bands removed and taped to the DC instruction sheet, signed by patient and witnessed by RN. Patient discharged home in stable condition accompanied by fob. Discharged via wheelchair, holding baby in a rear facing car seat.

## 2023-09-13 NOTE — DISCHARGE SUMMARY
Obstetrical Discharge Form    Gestational Age:39w3d    Antepartum complications: LGA, Polyhydramnios    Date of Delivery: 23     Type of Delivery: vaginal, spontaneous    Delivered By:  AP         Baby:   Information for the patient's :  Juliana Crystal [[id number]]      Anesthesia: Epidural    Intrapartum complications: None    Postpartum complications: none    Condition: good    Discharge Medication:      Medication List        ASK your doctor about these medications      PRENATAL 1+1 PO               Discharge Date: 23    Plan:   Follow up in 6 week(s)     SUSIE Carreno

## 2023-09-13 NOTE — LACTATION NOTE
This note was copied from a baby's chart. Lactation Progress Note      Data:     F/U on multip breast feeder who struggled with latch and supply with first baby. Mob states that this baby continues to feed well with a comfortable latch. Blood sugars, output and weight loss are WNL. Action: Discharge teaching done; what to expect in the first few days of life, to feed baby at first sign of hunger cue for total of 8-12 times per day after the first DOL, how to properly position and latch baby, how to know baby is getting enough, engorgement prevention and treatment, avoiding bottles and pacifiers, community resources, pumping and return to work. Encouraged to call [facility] or [facility] clinic for f/u prn. Response: Verbalized understanding and comfortable with breast feeding for d/c.

## 2023-09-13 NOTE — DISCHARGE INSTRUCTIONS
Thank you for the opportunity to care for you and your family. We hope that you are happy with the care we provided during your stay in the [facility]. We want to ensure that you have the help you need when you leave the hospital. If there is anything we can assist you with, please let us know. Breastfeeding mothers may contact our lactation specialists with any problems or questions. [facility] lactation services phone number is [phone]. Leave a message and your call will be returned. Please refer to the information provided in the postpartum care booklet. The following are warning signs to remember. Call 911 if you have:    Chest pain or pressure  Shortness of breath, even at rest  Thoughts of harming yourself or others  Seizures    Call your healthcare provider if you have:    Temperature of 100.4 degrees or higher  Stitches that are not healing        -- Swelling, bleeding, drainage, foul odor, redness or warmth in/around your           stitches, staples, or incision (scar)        -- Bad smelling blood or discharge from the vagina  Vaginal bleeding that has increased         -- Soaking through one pad in an hour        -- You are passing clots larger than the size of a lemon  Red, warm tender area(s) in your breast or calf  Headache that does not get better, even after taking medicine; or headache with vision changes    Remember to notify all healthcare providers from your date of delivery to up to one year after giving birth! CARING FOR YOURSELF        DIET/ACTIVITY    Eat a well balanced diet focusing on foods high in fiber and protein. Drink plenty of fluids, especially water. To avoid constipation you may take a mild stool softener as recommended by your doctor or midwife. Gradually increase your activity. Resume an exercise regime only after being advised by your doctor or midwife. When sitting or lying down, keep your legs elevated to reduce swelling.   Avoid

## 2023-09-13 NOTE — LACTATION NOTE
This note was copied from a baby's chart. Lactation Progress Note      Data:     F/u during lactation rounds with multip breast feeder, 12 hour old infant. Baby is already latched on the right breast on consult and nursing well. Nipple was rounded after good feeding observed and infant drowsy showing signs of satisfaction. Mother states struggles to latch on the left breast.     Action: Introduced self as LC on for this evening and offered support with latching on the left breast. Gave tips to achieve FRANCOIS and reviewed positioning tips. Encouraged to try football hold on the left and explained rationale of laying infant on her left side at the breast as she does well in this position on the right breast in cradle hold. Infant drowsy and without cues or attempts to latch after good feeding on the right breast. Reviewed tips for latching on the left and educated on ongoing lactation support and how to contact. Encouraged to call for latch assessment on the left breast with next feeding and as needed during hospital stay. Educated on how a good latch should look and feel vs a shallow latch and reassured of FRANCOIS observed with the feeding on the right breast. Educated that the latch should feel comfortable without pinching or pain, and instructed on how to break the suction of the latch and attempt to relatch more deeply as needed if ever experiencing discomfort. Reviewed what to expect with breast feeding over the next 24-48 hours including breast care, how milk production works, educated on signs of hunger, satiety and what to expect with  feeding behaviors, as well as reassuring signs that baby is getting enough at the breast including daily goals for infant feedings, output, and weight trends. Encouraged to offer the breast ad romana with hunger cues, and every 2-3 hours if baby is sleepy and without feeding cues.  Gave tips to wake sleepy baby as needed, and encouraged much hand expression and STS contact with

## 2024-04-02 ENCOUNTER — HOSPITAL ENCOUNTER (OUTPATIENT)
Age: 29
Discharge: HOME | End: 2024-04-02
Payer: OTHER GOVERNMENT

## 2024-04-02 DIAGNOSIS — K90.9 DIARRHEA DUE TO MALABSORPTION: Primary | ICD-10-CM

## 2024-04-02 DIAGNOSIS — R19.7 DIARRHEA DUE TO MALABSORPTION: ICD-10-CM

## 2024-04-02 DIAGNOSIS — R19.7 DIARRHEA DUE TO MALABSORPTION: Primary | ICD-10-CM

## 2024-04-02 DIAGNOSIS — K90.9 DIARRHEA DUE TO MALABSORPTION: ICD-10-CM

## 2024-04-02 LAB
BASOPHILS # BLD: 0 K/UL (ref 0–0.2)
BASOPHILS NFR BLD: 0.7 %
DEPRECATED RDW RBC AUTO: 12.6 % (ref 12.4–15.4)
EOSINOPHIL # BLD: 0.2 K/UL (ref 0–0.6)
EOSINOPHIL NFR BLD: 3.1 %
HCT VFR BLD AUTO: 37.3 % (ref 36–48)
HGB BLD-MCNC: 12.7 G/DL (ref 12–16)
IGA SERPL-MCNC: 161 MG/DL (ref 70–400)
LYMPHOCYTES # BLD: 2.9 K/UL (ref 1–5.1)
LYMPHOCYTES NFR BLD: 46 %
MCH RBC QN AUTO: 28.4 PG (ref 26–34)
MCHC RBC AUTO-ENTMCNC: 34.1 G/DL (ref 31–36)
MCV RBC AUTO: 83.3 FL (ref 80–100)
MONOCYTES # BLD: 0.3 K/UL (ref 0–1.3)
MONOCYTES NFR BLD: 5 %
NEUTROPHILS # BLD: 2.8 K/UL (ref 1.7–7.7)
NEUTROPHILS NFR BLD: 45.2 %
PLATELET # BLD AUTO: 180 K/UL (ref 135–450)
PMV BLD AUTO: 8.5 FL (ref 5–10.5)
RBC # BLD AUTO: 4.48 M/UL (ref 4–5.2)
WBC # BLD AUTO: 6.3 K/UL (ref 4–11)

## 2024-04-02 PROCEDURE — 82784 ASSAY IGA/IGD/IGG/IGM EACH: CPT

## 2024-04-02 PROCEDURE — 85025 COMPLETE CBC W/AUTO DIFF WBC: CPT

## 2024-04-02 PROCEDURE — 83516 IMMUNOASSAY NONANTIBODY: CPT

## 2024-04-02 PROCEDURE — 36415 COLL VENOUS BLD VENIPUNCTURE: CPT

## 2024-04-03 LAB — TISSUE TRANSGLUTAMINASE IGA: <0.5 U/ML (ref 0–14)

## 2024-04-04 ENCOUNTER — HOSPITAL ENCOUNTER (OUTPATIENT)
Age: 29
Setting detail: SPECIMEN
Discharge: HOME | End: 2024-04-04
Payer: OTHER GOVERNMENT

## 2024-04-04 DIAGNOSIS — R19.7 DIARRHEA DUE TO MALABSORPTION: ICD-10-CM

## 2024-04-04 DIAGNOSIS — K90.9 DIARRHEA DUE TO MALABSORPTION: ICD-10-CM

## 2024-04-04 PROCEDURE — 82653 EL-1 FECAL QUANTITATIVE: CPT

## 2024-04-04 PROCEDURE — 82705 FATS/LIPIDS FECES QUAL: CPT

## 2024-04-06 LAB
FAT STL QL: NORMAL
NEUTRAL FAT STL QL: NORMAL

## 2024-04-09 LAB — ELASTASE PANC STL-MCNT: >800 UG/G
